# Patient Record
(demographics unavailable — no encounter records)

---

## 2024-11-17 NOTE — PHYSICAL EXAM
[de-identified] : General: Well-nourished, well-developed, alert, and in no acute distress.  Head: Normocephalic.  Eyes: Pupils equal, extraocular muscles intact, normal sclera.  Nose: No nasal discharge.  Cardiovascular: Extremities are warm and well-perfused. Distal pulses are symmetric bilaterally.  Respiratory: No labored breathing.  Extremities: Sensation is intact distally bilaterally. Distal pulses are symmetric bilaterally  Lymphatic: No regional lymphadenopathy, no lymphedema  Neurologic: No focal deficits  Skin: Normal skin color, texture, and turgor  Psychiatric: Normal affect  MSK: Examination of [right] knee:   Gait [normal] Effusion: none No erythema, hematoma or skin lesion Tender to palpation: medial patellar facet, lateral patellar facet Nontender to palpation: medial joint line, lateral joint line, quad tendon, patellar tendon, pes, Gerdy's tubercle, tibial tuberosity, popliteal fossa, hamstrings, ITB No warmth No Baker's cyst palpable ROM: 0-[120] [No] patellar crepitus   Log roll negative Lachman negative Anterior drawer negative Posterior drawer negative Varus/valgus stress negative at 0 and 30 deg Norma negative Patellar grind positive Extensor mechanism intact     Examination of [left] knee:   No effusion, erythema, hematoma or skin lesion Tender to palpation: medial patellar facet, lateral patellar facet Nontender to palpation: medial joint line, lateral joint line, quad tendon, patellar tendon, pes, Gerdy's tubercle, tibial tuberosity, popliteal fossa, hamstrings, ITB No warmth No Baker's cyst palpable ROM: 0-120 [No] patellar crepitus   Log roll negative Lachman negative Anterior drawer negative Posterior drawer negative Varus/valgus stress negative at 0 and 30 deg Norma negative Patellar grind positive Extensor mechanism intact   Sensation is intact to light touch over the superficial and deep peroneal nerve distributions and the posterior tibial nerve distribution. Capillary refill is less than two seconds. Posterior tibial and dorsalis pedis pulses 2+ equal bilaterally. No calf swelling or tenderness bilaterally. Strength testing shows hip flexion 5/5, hip adduction 5/5, hip abduction 5/5, knee extension 5/5, knee flexion 5/5, dorsiflexion 5/5, plantar flexion 5/5, EHL 5/5 Reflexes: Patellar 2+, Achilles 2+ [de-identified] : Date: 11/15/2024 Location: [facility] Body part: XRAY B/L KNEE independently reviewed and interpreted by me. Impression: No evidence of fracture or dislocation. Joint spaces are preserved. Moderate lateral patellar displacement bilaterally.

## 2024-11-17 NOTE — PHYSICAL EXAM
[de-identified] : General: Well-nourished, well-developed, alert, and in no acute distress.  Head: Normocephalic.  Eyes: Pupils equal, extraocular muscles intact, normal sclera.  Nose: No nasal discharge.  Cardiovascular: Extremities are warm and well-perfused. Distal pulses are symmetric bilaterally.  Respiratory: No labored breathing.  Extremities: Sensation is intact distally bilaterally. Distal pulses are symmetric bilaterally  Lymphatic: No regional lymphadenopathy, no lymphedema  Neurologic: No focal deficits  Skin: Normal skin color, texture, and turgor  Psychiatric: Normal affect  MSK: Examination of [right] knee:   Gait [normal] Effusion: none No erythema, hematoma or skin lesion Tender to palpation: medial patellar facet, lateral patellar facet Nontender to palpation: medial joint line, lateral joint line, quad tendon, patellar tendon, pes, Gerdy's tubercle, tibial tuberosity, popliteal fossa, hamstrings, ITB No warmth No Baker's cyst palpable ROM: 0-[120] [No] patellar crepitus   Log roll negative Lachman negative Anterior drawer negative Posterior drawer negative Varus/valgus stress negative at 0 and 30 deg Norma negative Patellar grind positive Extensor mechanism intact     Examination of [left] knee:   No effusion, erythema, hematoma or skin lesion Tender to palpation: medial patellar facet, lateral patellar facet Nontender to palpation: medial joint line, lateral joint line, quad tendon, patellar tendon, pes, Gerdy's tubercle, tibial tuberosity, popliteal fossa, hamstrings, ITB No warmth No Baker's cyst palpable ROM: 0-120 [No] patellar crepitus   Log roll negative Lachman negative Anterior drawer negative Posterior drawer negative Varus/valgus stress negative at 0 and 30 deg Norma negative Patellar grind positive Extensor mechanism intact   Sensation is intact to light touch over the superficial and deep peroneal nerve distributions and the posterior tibial nerve distribution. Capillary refill is less than two seconds. Posterior tibial and dorsalis pedis pulses 2+ equal bilaterally. No calf swelling or tenderness bilaterally. Strength testing shows hip flexion 5/5, hip adduction 5/5, hip abduction 5/5, knee extension 5/5, knee flexion 5/5, dorsiflexion 5/5, plantar flexion 5/5, EHL 5/5 Reflexes: Patellar 2+, Achilles 2+ [de-identified] : Date: 11/15/2024 Location: [facility] Body part: XRAY B/L KNEE independently reviewed and interpreted by me. Impression: No evidence of fracture or dislocation. Joint spaces are preserved. Moderate lateral patellar displacement bilaterally.

## 2024-11-17 NOTE — HISTORY OF PRESENT ILLNESS
[de-identified] : SPECIAL SHIPMAN is a 36-year-old female with bilateral knee pain. Pain started in June 2024.  The pain started when she was in the bathroom watching her niece and turned, causing discomfort in the knee. The patient is unsure if it is the right or left knee but confirms that the knee bothered her. There was no swelling or increase in size. The patient describes the pain as occurring after certain movements, particularly during hip-hop and rugby exercises.  She denies any numbness or tingling.  She takes Motrin, heat, and ice for pain relief, which helps. She has not undergone any physical therapy yet. Patient presents with her Aunt Anita Shipman (legal guardian) who provided additional history.

## 2024-11-17 NOTE — DISCUSSION/SUMMARY

## 2024-11-17 NOTE — END OF VISIT
[FreeTextEntry3] : Documented by Annika Leon acting as a scribe for Anita Contreras on 11/15/2024   All medical record entries made by the Scribe were at my, Dr. Anita Contreras direction and personally dictated by me on 11/15/2024 . I have reviewed the chart and agree that the record accurately reflects my personal performance of the history, physical exam, assessment and plan. I have also personally directed, reviewed, and agreed with the chart. [Time Spent: ___ minutes] : I have spent [unfilled] minutes of time on the encounter which excludes teaching and separately reported services.

## 2024-11-17 NOTE — ADDENDUM
[FreeTextEntry1] : I, Annika Leon ([facility]) assisted in filling out this chart under the dictation of Anita Contreras on 11/15/2024 .

## 2024-11-17 NOTE — DISCUSSION/SUMMARY

## 2024-11-17 NOTE — ASSESSMENT
[FreeTextEntry1] : SPECIAL MONTELONGO is a 36-year-old female with bilateral knee R>L pain. I discussed with the patient that their symptoms, signs, and imaging are most consistent with patellar maltracking and patellofemoral pain syndrome. We reviewed the natural history of this condition and treatment options. We agreed on the following plan:    XR taken and reviewed with the patient today. Activity modification: low-impact aerobic activity (stationary bike, elliptical, swimming) Recommend 150 min per week of moderate-intensity aerobic activity Start Home Exercises for patellofemoral knee conditioning. Demonstration and handout provided. Start physical therapy. Referral provided. Medication: Ibuprofen as needed. Can also try diclofenac gel. Advanced imaging: consider MRI if no symptomatic improvement Follow up in 6-8 weeks.

## 2024-11-17 NOTE — ADDENDUM
[FreeTextEntry1] : I, Annika Leon ([facility]) assisted in filling out this chart under the dictation of Anita Contreras on 11/15/2024 .

## 2024-11-17 NOTE — HISTORY OF PRESENT ILLNESS
[de-identified] : SPECIAL SHIPMAN is a 36-year-old female with bilateral knee pain. Pain started in June 2024.  The pain started when she was in the bathroom watching her niece and turned, causing discomfort in the knee. The patient is unsure if it is the right or left knee but confirms that the knee bothered her. There was no swelling or increase in size. The patient describes the pain as occurring after certain movements, particularly during hip-hop and rugby exercises.  She denies any numbness or tingling.  She takes Motrin, heat, and ice for pain relief, which helps. She has not undergone any physical therapy yet. Patient presents with her Aunt Anita Shipman (legal guardian) who provided additional history.

## 2025-01-03 NOTE — HISTORY OF PRESENT ILLNESS
[de-identified] : SPECIAL MONTELONGO is a 36-year-old female who presents for a follow-up of bilateral knee pain. Last visit was on 11/15/2024. She reports pain is getting better. She is performing at-home exercises but has not attended physical therapy. The patient is not currently taking any medication for pain.

## 2025-01-03 NOTE — DISCUSSION/SUMMARY

## 2025-01-03 NOTE — ASSESSMENT
[FreeTextEntry1] : SPECIAL MONTELONGO is a 36-year-old female with bilateral knee pain.     I discussed with the patient that their symptoms, signs, and imaging are most consistent with patellar maltracking and patellofemoral pain syndrome. We reviewed the natural history of this condition and treatment options. We agreed on the following plan:  XR reviewed again today Encouraged to start home exercises. New handout for patellofemoral conditioning provided today. Start physical therapy. New referral provided. Recommend 150 min per week of moderate-intensity aerobic activity  Imaging: Consider MRI if symptoms worsen. Follow up in 8 weeks.

## 2025-01-03 NOTE — ADDENDUM
[FreeTextEntry1] : I, Annika Leon ([facility]) assisted in filling out this chart under the dictation of Anita Contreras on 01/03/2025 .

## 2025-01-03 NOTE — PHYSICAL EXAM
[de-identified] : General: Well-nourished, well-developed, alert, and in no acute distress.  Head: Normocephalic.  Eyes: Pupils equal, extraocular muscles intact, normal sclera.  Nose: No nasal discharge.  Cardiovascular: Extremities are warm and well-perfused. Distal pulses are symmetric bilaterally.  Respiratory: No labored breathing.  Extremities: Sensation is intact distally bilaterally. Distal pulses are symmetric bilaterally  Lymphatic: No regional lymphadenopathy, no lymphedema  Neurologic: No focal deficits  Skin: Normal skin color, texture, and turgor  Psychiatric: Normal affect  MSK: Examination of [right] knee:  Gait [normal] Effusion: none No erythema, hematoma or skin lesion Nontender to palpation: medial joint line, lateral joint line,  medial patellar facet, lateral patellar facet, quad tendon, patellar tendon, pes, Gerdy's tubercle, tibial tuberosity, popliteal fossa, hamstrings, ITB No warmth No Baker's cyst palpable ROM: 0-[120] [No] patellar crepitus  Log roll negative Lachman negative Anterior drawer negative Posterior drawer negative Varus/valgus stress negative at 0 and 30 deg Norma negative Patellar grind negative Extensor mechanism intact   Examination of [left] knee:  No effusion, erythema, hematoma or skin lesion Nontender to palpation: medial joint line, lateral joint line, medial patellar facet, lateral patellar facet, quad tendon, patellar tendon, pes, Gerdy's tubercle, tibial tuberosity, popliteal fossa, hamstrings, ITB No warmth No Baker's cyst palpable ROM: 0-120 [No] patellar crepitus  Log roll negative Lachman negative Anterior drawer negative Posterior drawer negative Varus/valgus stress negative at 0 and 30 deg Norma negative Patellar grind positive Extensor mechanism intact  Sensation is intact to light touch over the superficial and deep peroneal nerve distributions and the posterior tibial nerve distribution. Capillary refill is less than two seconds. Posterior tibial and dorsalis pedis pulses 2+ equal bilaterally. No calf swelling or tenderness bilaterally. Strength testing shows hip flexion 5/5, hip adduction 5/5, hip abduction 5/5, knee extension 5/5, knee flexion 5/5, dorsiflexion 5/5, plantar flexion 5/5, EHL 5/5 Reflexes: Patellar 2+, Achilles 2+

## 2025-01-03 NOTE — PHYSICAL EXAM
[de-identified] : General: Well-nourished, well-developed, alert, and in no acute distress.  Head: Normocephalic.  Eyes: Pupils equal, extraocular muscles intact, normal sclera.  Nose: No nasal discharge.  Cardiovascular: Extremities are warm and well-perfused. Distal pulses are symmetric bilaterally.  Respiratory: No labored breathing.  Extremities: Sensation is intact distally bilaterally. Distal pulses are symmetric bilaterally  Lymphatic: No regional lymphadenopathy, no lymphedema  Neurologic: No focal deficits  Skin: Normal skin color, texture, and turgor  Psychiatric: Normal affect  MSK: Examination of [right] knee:  Gait [normal] Effusion: none No erythema, hematoma or skin lesion Nontender to palpation: medial joint line, lateral joint line,  medial patellar facet, lateral patellar facet, quad tendon, patellar tendon, pes, Gerdy's tubercle, tibial tuberosity, popliteal fossa, hamstrings, ITB No warmth No Baker's cyst palpable ROM: 0-[120] [No] patellar crepitus  Log roll negative Lachman negative Anterior drawer negative Posterior drawer negative Varus/valgus stress negative at 0 and 30 deg Norma negative Patellar grind negative Extensor mechanism intact   Examination of [left] knee:  No effusion, erythema, hematoma or skin lesion Nontender to palpation: medial joint line, lateral joint line, medial patellar facet, lateral patellar facet, quad tendon, patellar tendon, pes, Gerdy's tubercle, tibial tuberosity, popliteal fossa, hamstrings, ITB No warmth No Baker's cyst palpable ROM: 0-120 [No] patellar crepitus  Log roll negative Lachman negative Anterior drawer negative Posterior drawer negative Varus/valgus stress negative at 0 and 30 deg Norma negative Patellar grind positive Extensor mechanism intact  Sensation is intact to light touch over the superficial and deep peroneal nerve distributions and the posterior tibial nerve distribution. Capillary refill is less than two seconds. Posterior tibial and dorsalis pedis pulses 2+ equal bilaterally. No calf swelling or tenderness bilaterally. Strength testing shows hip flexion 5/5, hip adduction 5/5, hip abduction 5/5, knee extension 5/5, knee flexion 5/5, dorsiflexion 5/5, plantar flexion 5/5, EHL 5/5 Reflexes: Patellar 2+, Achilles 2+

## 2025-01-03 NOTE — ADDENDUM
[FreeTextEntry1] : I, Annika Leon ([facility]) assisted in filling out this chart under the dictation of Anita Contreras on 01/03/2025 .

## 2025-01-03 NOTE — DISCUSSION/SUMMARY

## 2025-01-03 NOTE — HISTORY OF PRESENT ILLNESS
[de-identified] : SPECIAL MONTELONGO is a 36-year-old female who presents for a follow-up of bilateral knee pain. Last visit was on 11/15/2024. She reports pain is getting better. She is performing at-home exercises but has not attended physical therapy. The patient is not currently taking any medication for pain.

## 2025-01-03 NOTE — END OF VISIT
[FreeTextEntry3] : Documented by Annika Leon acting as a scribe for Anita Contreras on 01/03/2025   All medical record entries made by the Scribe were at my, Dr. Anita Contreras direction and personally dictated by me on 01/03/2025 . I have reviewed the chart and agree that the record accurately reflects my personal performance of the history, physical exam, assessment and plan. I have also personally directed, reviewed, and agreed with the chart. [Time Spent: ___ minutes] : I have spent [unfilled] minutes of time on the encounter which excludes teaching and separately reported services.

## 2025-03-02 NOTE — END OF VISIT
[FreeTextEntry3] : Documented by Annika Leon acting as a scribe for Anita Contreras on 02/28/2025   All medical record entries made by the Scribe were at my, Dr. Anita Contreras direction and personally dictated by me on 02/28/2025 . I have reviewed the chart and agree that the record accurately reflects my personal performance of the history, physical exam, assessment and plan. I have also personally directed, reviewed, and agreed with the chart.

## 2025-03-02 NOTE — HISTORY OF PRESENT ILLNESS
[de-identified] : SPECIAL MONTELONGO is a 36-year-old female who presents for a follow-up of bilateral knee pain. Last visit was on 01/03/2025. She reports pain is better. She is performing at-home exercises. She is currently not taking any pain medication. The patient is improving. She presents today with her father, Chris.

## 2025-03-02 NOTE — PHYSICAL EXAM
[de-identified] : General: Well-nourished, well-developed, alert, and in no acute distress.  Head: Normocephalic.  Eyes: Pupils equal, extraocular muscles intact, normal sclera.  Nose: No nasal discharge.  Cardiovascular: Extremities are warm and well-perfused. Distal pulses are symmetric bilaterally.  Respiratory: No labored breathing.  Extremities: Sensation is intact distally bilaterally. Distal pulses are symmetric bilaterally  Lymphatic: No regional lymphadenopathy, no lymphedema  Neurologic: No focal deficits  Skin: Normal skin color, texture, and turgor  Psychiatric: Normal affect  MSK: Examination of [right] knee:  Gait [normal] Effusion: none No erythema, hematoma or skin lesion Nontender to palpation: medial joint line, lateral joint line, medial patellar facet, lateral patellar facet, quad tendon, patellar tendon, pes, Gerdy's tubercle, tibial tuberosity, popliteal fossa, hamstrings, ITB No warmth No Baker's cyst palpable ROM: 0-[120] [No] patellar crepitus  Log roll negative Lachman negative Anterior drawer negative Posterior drawer negative Varus/valgus stress negative at 0 and 30 deg Norma negative  Examination of [left] knee:  No effusion, erythema, hematoma or skin lesion Nontender to palpation: medial joint line, lateral joint line, medial patellar facet, lateral patellar facet, quad tendon, patellar tendon, pes, Gerdy's tubercle, tibial tuberosity, popliteal fossa, hamstrings, ITB No warmth No Baker's cyst palpable ROM: 0-120 [No] patellar crepitus  Log roll negative Lachman negative Anterior drawer negative Posterior drawer negative Varus/valgus stress negative at 0 and 30 deg Norma negative  Sensation is intact to light touch over the superficial and deep peroneal nerve distributions and the posterior tibial nerve distribution. Capillary refill is less than two seconds. Posterior tibial and dorsalis pedis pulses 2+ equal bilaterally. No calf swelling or tenderness bilaterally. Strength testing shows hip flexion 5/5, hip adduction 5/5, hip abduction 5/5, knee extension 5/5, knee flexion 5/5, dorsiflexion 5/5, plantar flexion 5/5, EHL 5/5 Reflexes: Patellar 2+, Achilles 2+

## 2025-03-02 NOTE — HISTORY OF PRESENT ILLNESS
[de-identified] : SPECIAL MONTELONGO is a 36-year-old female who presents for a follow-up of bilateral knee pain. Last visit was on 01/03/2025. She reports pain is better. She is performing at-home exercises. She is currently not taking any pain medication. The patient is improving. She presents today with her father, Chris.

## 2025-03-02 NOTE — ADDENDUM
[FreeTextEntry1] : I, Annika Leon ([facility]) assisted in filling out this chart under the dictation of Anita Contreras on 02/28/2025 .

## 2025-03-02 NOTE — ASSESSMENT
[FreeTextEntry1] : SPECIAL MONTELONGO is a 36-year-old female with bilateral knee pain. I discussed with the patient that their symptoms, signs, and imaging are most consistent with patellofemoral pain syndrome. We reviewed the natural history of this condition and treatment options. We agreed on the following plan:   Encouraged to continue home exercises per handout. Follow up as needed.

## 2025-03-02 NOTE — ADDENDUM
[FreeTextEntry1] : I, Annika Leon ([facility]) assisted in filling out this chart under the dictation of Anita Contreras on 02/28/2025 .

## 2025-03-02 NOTE — PHYSICAL EXAM
[de-identified] : General: Well-nourished, well-developed, alert, and in no acute distress.  Head: Normocephalic.  Eyes: Pupils equal, extraocular muscles intact, normal sclera.  Nose: No nasal discharge.  Cardiovascular: Extremities are warm and well-perfused. Distal pulses are symmetric bilaterally.  Respiratory: No labored breathing.  Extremities: Sensation is intact distally bilaterally. Distal pulses are symmetric bilaterally  Lymphatic: No regional lymphadenopathy, no lymphedema  Neurologic: No focal deficits  Skin: Normal skin color, texture, and turgor  Psychiatric: Normal affect  MSK: Examination of [right] knee:  Gait [normal] Effusion: none No erythema, hematoma or skin lesion Nontender to palpation: medial joint line, lateral joint line, medial patellar facet, lateral patellar facet, quad tendon, patellar tendon, pes, Gerdy's tubercle, tibial tuberosity, popliteal fossa, hamstrings, ITB No warmth No Baker's cyst palpable ROM: 0-[120] [No] patellar crepitus  Log roll negative Lachman negative Anterior drawer negative Posterior drawer negative Varus/valgus stress negative at 0 and 30 deg Norma negative  Examination of [left] knee:  No effusion, erythema, hematoma or skin lesion Nontender to palpation: medial joint line, lateral joint line, medial patellar facet, lateral patellar facet, quad tendon, patellar tendon, pes, Gerdy's tubercle, tibial tuberosity, popliteal fossa, hamstrings, ITB No warmth No Baker's cyst palpable ROM: 0-120 [No] patellar crepitus  Log roll negative Lachman negative Anterior drawer negative Posterior drawer negative Varus/valgus stress negative at 0 and 30 deg Norma negative  Sensation is intact to light touch over the superficial and deep peroneal nerve distributions and the posterior tibial nerve distribution. Capillary refill is less than two seconds. Posterior tibial and dorsalis pedis pulses 2+ equal bilaterally. No calf swelling or tenderness bilaterally. Strength testing shows hip flexion 5/5, hip adduction 5/5, hip abduction 5/5, knee extension 5/5, knee flexion 5/5, dorsiflexion 5/5, plantar flexion 5/5, EHL 5/5 Reflexes: Patellar 2+, Achilles 2+